# Patient Record
Sex: FEMALE | Race: WHITE | NOT HISPANIC OR LATINO | Employment: PART TIME | URBAN - NONMETROPOLITAN AREA
[De-identification: names, ages, dates, MRNs, and addresses within clinical notes are randomized per-mention and may not be internally consistent; named-entity substitution may affect disease eponyms.]

---

## 2023-08-08 ENCOUNTER — OFFICE VISIT (OUTPATIENT)
Dept: OBSTETRICS AND GYNECOLOGY | Facility: CLINIC | Age: 23
End: 2023-08-08
Payer: COMMERCIAL

## 2023-08-08 VITALS
WEIGHT: 138 LBS | BODY MASS INDEX: 24.45 KG/M2 | SYSTOLIC BLOOD PRESSURE: 100 MMHG | HEIGHT: 63 IN | DIASTOLIC BLOOD PRESSURE: 62 MMHG

## 2023-08-08 DIAGNOSIS — Z30.09 FAMILY PLANNING ADVICE: Primary | ICD-10-CM

## 2023-08-08 PROCEDURE — 99213 OFFICE O/P EST LOW 20 MIN: CPT | Performed by: OBSTETRICS & GYNECOLOGY

## 2023-08-08 NOTE — PROGRESS NOTES
"Maria Elena Arellano is a 22 y.o. female here today to discuss family-planning after a miscarriage in .  She had a early first trimester miscarriage, and she is currently cycle day 18 after resuming normal menstrual cycles.  She is considering another pregnancy, but is not sure of the timeframe.    Visit Vitals  /62 (BP Location: Left arm, Patient Position: Sitting)   Ht 160 cm (63\")   Wt 62.6 kg (138 lb)   LMP 2023 (Approximate)   Breastfeeding Unknown   BMI 24.45 kg/mý     Pleasant female no acute distress  Mood and affect normal  Breathing unlabored    Assessment: Recent spontaneous , family-planning advice    I recommend that she continue a daily prenatal vitamin, and add a daily 1 mg folic acid supplement.  She declines contraception at this time, but will contact the office if she desires to delay pregnancy.  I did offer to follow serial hCG levels with her next positive pregnancy test. We discussed other routine measures for healthy pregnancy.  Call with questions or concerns.       "

## 2024-03-08 ENCOUNTER — APPOINTMENT (OUTPATIENT)
Dept: CT IMAGING | Facility: HOSPITAL | Age: 24
End: 2024-03-08
Payer: COMMERCIAL

## 2024-03-08 ENCOUNTER — HOSPITAL ENCOUNTER (EMERGENCY)
Facility: HOSPITAL | Age: 24
Discharge: HOME | End: 2024-03-08
Payer: COMMERCIAL

## 2024-03-08 VITALS
HEART RATE: 90 BPM | OXYGEN SATURATION: 98 % | HEIGHT: 63 IN | SYSTOLIC BLOOD PRESSURE: 108 MMHG | TEMPERATURE: 98.5 F | WEIGHT: 143 LBS | DIASTOLIC BLOOD PRESSURE: 52 MMHG | BODY MASS INDEX: 25.34 KG/M2 | RESPIRATION RATE: 17 BRPM

## 2024-03-08 DIAGNOSIS — Z34.90 PREGNANCY, UNSPECIFIED GESTATIONAL AGE: ICD-10-CM

## 2024-03-08 DIAGNOSIS — R55 SYNCOPE AND COLLAPSE: Primary | ICD-10-CM

## 2024-03-08 DIAGNOSIS — R06.00 DYSPNEA, UNSPECIFIED TYPE: ICD-10-CM

## 2024-03-08 DIAGNOSIS — N39.0 ACUTE UTI: ICD-10-CM

## 2024-03-08 LAB
ALBUMIN SERPL-MCNC: 4.5 G/DL (ref 3.5–5.2)
ALBUMIN/GLOB SERPL: 1.3 G/DL
ALP SERPL-CCNC: 100 U/L (ref 39–117)
ALT SERPL W P-5'-P-CCNC: 11 U/L (ref 1–33)
ANION GAP SERPL CALCULATED.3IONS-SCNC: 10 MMOL/L (ref 5–15)
AST SERPL-CCNC: 16 U/L (ref 1–32)
B PARAPERT DNA SPEC QL NAA+PROBE: NOT DETECTED
B PERT DNA SPEC QL NAA+PROBE: NOT DETECTED
BACTERIA UR QL AUTO: ABNORMAL /HPF
BASOPHILS # BLD AUTO: 0.05 10*3/MM3 (ref 0–0.2)
BASOPHILS NFR BLD AUTO: 0.5 % (ref 0–1.5)
BILIRUB SERPL-MCNC: 0.2 MG/DL (ref 0–1.2)
BILIRUB UR QL STRIP: NEGATIVE
BUN SERPL-MCNC: 5 MG/DL (ref 6–20)
BUN/CREAT SERPL: 10.9 (ref 7–25)
C PNEUM DNA NPH QL NAA+NON-PROBE: NOT DETECTED
CALCIUM SPEC-SCNC: 10 MG/DL (ref 8.6–10.5)
CHLORIDE SERPL-SCNC: 101 MMOL/L (ref 98–107)
CLARITY UR: CLEAR
CO2 SERPL-SCNC: 26 MMOL/L (ref 22–29)
COLOR UR: ABNORMAL
CREAT SERPL-MCNC: 0.46 MG/DL (ref 0.57–1)
DEPRECATED RDW RBC AUTO: 37.7 FL (ref 37–54)
EGFRCR SERPLBLD CKD-EPI 2021: 139 ML/MIN/1.73
EOSINOPHIL # BLD AUTO: 0.37 10*3/MM3 (ref 0–0.4)
EOSINOPHIL NFR BLD AUTO: 3.6 % (ref 0.3–6.2)
ERYTHROCYTE [DISTWIDTH] IN BLOOD BY AUTOMATED COUNT: 12.6 % (ref 12.3–15.4)
FLUAV SUBTYP SPEC NAA+PROBE: NOT DETECTED
FLUBV RNA ISLT QL NAA+PROBE: NOT DETECTED
GLOBULIN UR ELPH-MCNC: 3.5 GM/DL
GLUCOSE SERPL-MCNC: 59 MG/DL (ref 65–99)
GLUCOSE UR STRIP-MCNC: NEGATIVE MG/DL
HADV DNA SPEC NAA+PROBE: NOT DETECTED
HCOV 229E RNA SPEC QL NAA+PROBE: NOT DETECTED
HCOV HKU1 RNA SPEC QL NAA+PROBE: NOT DETECTED
HCOV NL63 RNA SPEC QL NAA+PROBE: NOT DETECTED
HCOV OC43 RNA SPEC QL NAA+PROBE: NOT DETECTED
HCT VFR BLD AUTO: 40.9 % (ref 34–46.6)
HGB BLD-MCNC: 13 G/DL (ref 12–15.9)
HGB UR QL STRIP.AUTO: NEGATIVE
HMPV RNA NPH QL NAA+NON-PROBE: NOT DETECTED
HPIV1 RNA ISLT QL NAA+PROBE: NOT DETECTED
HPIV2 RNA SPEC QL NAA+PROBE: NOT DETECTED
HPIV3 RNA NPH QL NAA+PROBE: NOT DETECTED
HPIV4 P GENE NPH QL NAA+PROBE: NOT DETECTED
HYALINE CASTS UR QL AUTO: ABNORMAL /LPF
IMM GRANULOCYTES # BLD AUTO: 0.02 10*3/MM3 (ref 0–0.05)
IMM GRANULOCYTES NFR BLD AUTO: 0.2 % (ref 0–0.5)
KETONES UR QL STRIP: NEGATIVE
LEUKOCYTE ESTERASE UR QL STRIP.AUTO: ABNORMAL
LYMPHOCYTES # BLD AUTO: 1.67 10*3/MM3 (ref 0.7–3.1)
LYMPHOCYTES NFR BLD AUTO: 16.2 % (ref 19.6–45.3)
M PNEUMO IGG SER IA-ACNC: NOT DETECTED
MCH RBC QN AUTO: 25.9 PG (ref 26.6–33)
MCHC RBC AUTO-ENTMCNC: 31.8 G/DL (ref 31.5–35.7)
MCV RBC AUTO: 81.5 FL (ref 79–97)
MONOCYTES # BLD AUTO: 0.5 10*3/MM3 (ref 0.1–0.9)
MONOCYTES NFR BLD AUTO: 4.9 % (ref 5–12)
NEUTROPHILS NFR BLD AUTO: 7.68 10*3/MM3 (ref 1.7–7)
NEUTROPHILS NFR BLD AUTO: 74.6 % (ref 42.7–76)
NITRITE UR QL STRIP: NEGATIVE
NRBC BLD AUTO-RTO: 0 /100 WBC (ref 0–0.2)
PH UR STRIP.AUTO: 6 [PH] (ref 5–8)
PLATELET # BLD AUTO: 323 10*3/MM3 (ref 140–450)
PMV BLD AUTO: 9.7 FL (ref 6–12)
POTASSIUM SERPL-SCNC: 3.5 MMOL/L (ref 3.5–5.2)
PROT SERPL-MCNC: 8 G/DL (ref 6–8.5)
PROT UR QL STRIP: ABNORMAL
RBC # BLD AUTO: 5.02 10*6/MM3 (ref 3.77–5.28)
RBC # UR STRIP: ABNORMAL /HPF
REF LAB TEST METHOD: ABNORMAL
RHINOVIRUS RNA SPEC NAA+PROBE: NOT DETECTED
RSV RNA NPH QL NAA+NON-PROBE: NOT DETECTED
SARS-COV-2 RNA NPH QL NAA+NON-PROBE: NOT DETECTED
SODIUM SERPL-SCNC: 137 MMOL/L (ref 136–145)
SP GR UR STRIP: 1.02 (ref 1–1.03)
SQUAMOUS #/AREA URNS HPF: ABNORMAL /HPF
TROPONIN T SERPL HS-MCNC: <6 NG/L
UROBILINOGEN UR QL STRIP: ABNORMAL
WBC # UR STRIP: ABNORMAL /HPF
WBC NRBC COR # BLD AUTO: 10.29 10*3/MM3 (ref 3.4–10.8)

## 2024-03-08 PROCEDURE — 99285 EMERGENCY DEPT VISIT HI MDM: CPT

## 2024-03-08 PROCEDURE — 25010000002 CEFTRIAXONE PER 250 MG: Performed by: NURSE PRACTITIONER

## 2024-03-08 PROCEDURE — 0202U NFCT DS 22 TRGT SARS-COV-2: CPT | Performed by: NURSE PRACTITIONER

## 2024-03-08 PROCEDURE — 93005 ELECTROCARDIOGRAM TRACING: CPT

## 2024-03-08 PROCEDURE — 25810000003 LACTATED RINGERS SOLUTION: Performed by: NURSE PRACTITIONER

## 2024-03-08 PROCEDURE — 93010 ELECTROCARDIOGRAM REPORT: CPT | Performed by: INTERNAL MEDICINE

## 2024-03-08 PROCEDURE — 80053 COMPREHEN METABOLIC PANEL: CPT | Performed by: NURSE PRACTITIONER

## 2024-03-08 PROCEDURE — 96365 THER/PROPH/DIAG IV INF INIT: CPT

## 2024-03-08 PROCEDURE — 25510000001 IOPAMIDOL PER 1 ML: Performed by: NURSE PRACTITIONER

## 2024-03-08 PROCEDURE — 71275 CT ANGIOGRAPHY CHEST: CPT

## 2024-03-08 PROCEDURE — 81001 URINALYSIS AUTO W/SCOPE: CPT | Performed by: NURSE PRACTITIONER

## 2024-03-08 PROCEDURE — 85025 COMPLETE CBC W/AUTO DIFF WBC: CPT | Performed by: NURSE PRACTITIONER

## 2024-03-08 PROCEDURE — 84484 ASSAY OF TROPONIN QUANT: CPT | Performed by: NURSE PRACTITIONER

## 2024-03-08 RX ORDER — CEFDINIR 300 MG/1
300 CAPSULE ORAL 2 TIMES DAILY
Qty: 20 CAPSULE | Refills: 0 | Status: SHIPPED | OUTPATIENT
Start: 2024-03-08 | End: 2024-03-18

## 2024-03-08 RX ORDER — MELATONIN
1000 DAILY
COMMUNITY

## 2024-03-08 RX ADMIN — SODIUM CHLORIDE, POTASSIUM CHLORIDE, SODIUM LACTATE AND CALCIUM CHLORIDE 1000 ML: 600; 310; 30; 20 INJECTION, SOLUTION INTRAVENOUS at 09:30

## 2024-03-08 RX ADMIN — CEFTRIAXONE 1000 MG: 1 INJECTION, POWDER, FOR SOLUTION INTRAMUSCULAR; INTRAVENOUS at 10:59

## 2024-03-08 RX ADMIN — IOPAMIDOL 84 ML: 755 INJECTION, SOLUTION INTRAVENOUS at 11:13

## 2024-03-08 NOTE — Clinical Note
[facility] EMERGENCY DEPARTMENT  [address]  [facility] [address]  Phone: [phone]    Maria Elena Arellano was seen and treated in our emergency department on 3/8/2024.  She may return to work on 03/11/2024.         Thank you for choosing [facility].    Felicity De Los Santos APRN

## 2024-03-08 NOTE — ED PROVIDER NOTES
Subjective   History of Present Illness  Patient is a 23-year-old female presents emergency department after having a syncopal episode just prior to arrival.  Patient states she had just gotten up and felt dizzy and then she went to take a shower and passed out.  She states she is having some shortness of breath associated with the dizziness and just prior to the syncope as well.  She has had a history of syncopal events previously however she has never had a workup for this.  She states she was always told to eat something when she felt this way.  She states she normally does not have shortness of breath with the episodes.  She is also 9 weeks pregnant.  She denies any chest pain.  She states she felt fine when she went to bed last night.  No cough.  No associated nausea.  No vaginal bleeding.  No abdominal pain.    History provided by:  Patient   used: No        Review of Systems   Constitutional: Negative.    HENT: Negative.     Eyes: Negative.    Respiratory:  Positive for shortness of breath.    Cardiovascular: Negative.    Gastrointestinal: Negative.    Endocrine: Negative.    Genitourinary: Negative.    Musculoskeletal: Negative.    Skin: Negative.    Allergic/Immunologic: Negative.    Neurological:  Positive for syncope.   Hematological: Negative.    Psychiatric/Behavioral: Negative.     All other systems reviewed and are negative.      Past Medical History:   Diagnosis Date    Teratoma        No Known Allergies    Past Surgical History:   Procedure Laterality Date    OVARY SURGERY      teratoma removal       History reviewed. No pertinent family history.    Social History     Socioeconomic History    Marital status:    Tobacco Use    Smoking status: Never   Vaping Use    Vaping status: Every Day   Substance and Sexual Activity    Alcohol use: Not Currently    Drug use: Not Currently    Sexual activity: Yes     Partners: Male     Birth control/protection: None       Prior to  "Admission medications    Not on File       /40   Pulse 96   Temp 98.1 °F (36.7 °C) (Oral)   Resp 18   Ht 160 cm (63\")   Wt 64.9 kg (143 lb)   LMP 07/22/2023 (Approximate)   SpO2 96%   Breastfeeding No   BMI 25.33 kg/m²     Objective   Physical Exam  Vitals and nursing note reviewed.   Constitutional:       Appearance: She is well-developed.      Comments: Non toxic appearing. No acute distress   HENT:      Head: Normocephalic and atraumatic.   Eyes:      Conjunctiva/sclera: Conjunctivae normal.      Pupils: Pupils are equal, round, and reactive to light.   Neck:      Thyroid: No thyromegaly.      Trachea: No tracheal deviation.   Cardiovascular:      Rate and Rhythm: Normal rate and regular rhythm.      Heart sounds: Normal heart sounds.   Pulmonary:      Effort: Pulmonary effort is normal. No respiratory distress.      Breath sounds: Normal breath sounds. No wheezing or rales.   Chest:      Chest wall: No tenderness.   Abdominal:      General: Bowel sounds are normal.      Palpations: Abdomen is soft.   Musculoskeletal:         General: Normal range of motion.      Cervical back: Normal range of motion and neck supple.   Skin:     General: Skin is warm and dry.      Comments: Mild pallor noted.    Neurological:      Mental Status: She is alert and oriented to person, place, and time.      Cranial Nerves: No cranial nerve deficit.      Deep Tendon Reflexes: Reflexes are normal and symmetric.   Psychiatric:         Behavior: Behavior normal.         Thought Content: Thought content normal.         Judgment: Judgment normal.         Procedures         Lab Results (last 24 hours)       Procedure Component Value Units Date/Time    Respiratory Panel PCR w/COVID-19(SARS-CoV-2) KAYLEE/MAIA/NICOLE/PAD/COR/RUTHIE In-House, NP Swab in UTM/VTM, 2 HR TAT - Swab, Nasopharynx [[id number]]  (Normal) Collected: 03/08/24 0911    Specimen: Swab from Nasopharynx Updated: 03/08/24 1010     ADENOVIRUS, PCR Not Detected     " Coronavirus 229E Not Detected     Coronavirus HKU1 Not Detected     Coronavirus NL63 Not Detected     Coronavirus OC43 Not Detected     COVID19 Not Detected     Human Metapneumovirus Not Detected     Human Rhinovirus/Enterovirus Not Detected     Influenza A PCR Not Detected     Influenza B PCR Not Detected     Parainfluenza Virus 1 Not Detected     Parainfluenza Virus 2 Not Detected     Parainfluenza Virus 3 Not Detected     Parainfluenza Virus 4 Not Detected     RSV, PCR Not Detected     Bordetella pertussis pcr Not Detected     Bordetella parapertussis PCR Not Detected     Chlamydophila pneumoniae PCR Not Detected     Mycoplasma pneumo by PCR Not Detected    Narrative:      In the setting of a positive respiratory panel with a viral infection PLUS a negative procalcitonin without other underlying concern for bacterial infection, consider observing off antibiotics or discontinuation of antibiotics and continue supportive care. If the respiratory panel is positive for atypical bacterial infection (Bordetella pertussis, Chlamydophila pneumoniae, or Mycoplasma pneumoniae), consider antibiotic de-escalation to target atypical bacterial infection.    Urinalysis With Microscopic If Indicated (No Culture) - Urine, Clean Catch [[id number]]  (Abnormal) Collected: 03/08/24 0919    Specimen: Urine, Clean Catch Updated: 03/08/24 0943     Color, UA Dark Yellow     Appearance, UA Clear     pH, UA 6.0     Specific Gravity, UA 1.021     Glucose, UA Negative     Ketones, UA Negative     Bilirubin, UA Negative     Blood, UA Negative     Protein, UA Trace     Leuk Esterase, UA Moderate (2+)     Nitrite, UA Negative     Urobilinogen, UA 0.2 E.U./dL    Urinalysis, Microscopic Only - Urine, Clean Catch [[id number]]  (Abnormal) Collected: 03/08/24 0919    Specimen: Urine, Clean Catch Updated: 03/08/24 0943     RBC, UA 3-5 /HPF      WBC, UA 21-50 /HPF      Bacteria, UA 1+ /HPF      Squamous Epithelial Cells, UA 0-2 /HPF      Hyaline Casts,  UA 7-12 /LPF      Methodology Automated Microscopy    CBC & Differential [[id number]]  (Abnormal) Collected: 03/08/24 0930    Specimen: Blood Updated: 03/08/24 0941    Narrative:      The following orders were created for panel order CBC & Differential.  Procedure                               Abnormality         Status                     ---------                               -----------         ------                     CBC Auto Differential[[id number]]        Abnormal            Final result                 Please view results for these tests on the individual orders.    Comprehensive Metabolic Panel [[id number]]  (Abnormal) Collected: 03/08/24 0930    Specimen: Blood Updated: 03/08/24 1003     Glucose 59 mg/dL      BUN 5 mg/dL      Creatinine 0.46 mg/dL      Sodium 137 mmol/L      Potassium 3.5 mmol/L      Chloride 101 mmol/L      CO2 26.0 mmol/L      Calcium 10.0 mg/dL      Total Protein 8.0 g/dL      Albumin 4.5 g/dL      ALT (SGPT) 11 U/L      AST (SGOT) 16 U/L      Alkaline Phosphatase 100 U/L      Total Bilirubin 0.2 mg/dL      Globulin 3.5 gm/dL      A/G Ratio 1.3 g/dL      BUN/Creatinine Ratio 10.9     Anion Gap 10.0 mmol/L      eGFR 139.0 mL/min/1.73     Narrative:      GFR Normal >60  Chronic Kidney Disease <60  Kidney Failure <15      Single High Sensitivity Troponin T [[id number]]  (Normal) Collected: 03/08/24 0930    Specimen: Blood Updated: 03/08/24 0957     HS Troponin T <6 ng/L     Narrative:      High Sensitive Troponin T Reference Range:  <14.0 ng/L- Negative Female for AMI  <22.0 ng/L- Negative Male for AMI  >=14 - Abnormal Female indicating possible myocardial injury.  >=22 - Abnormal Male indicating possible myocardial injury.   Clinicians would have to utilize clinical acumen, EKG, Troponin, and serial changes to determine if it is an Acute Myocardial Infarction or myocardial injury due to an underlying chronic condition.         CBC Auto Differential [[id number]]  (Abnormal) Collected:  03/08/24 0930    Specimen: Blood Updated: 03/08/24 0941     WBC 10.29 10*3/mm3      RBC 5.02 10*6/mm3      Hemoglobin 13.0 g/dL      Hematocrit 40.9 %      MCV 81.5 fL      MCH 25.9 pg      MCHC 31.8 g/dL      RDW 12.6 %      RDW-SD 37.7 fl      MPV 9.7 fL      Platelets 323 10*3/mm3      Neutrophil % 74.6 %      Lymphocyte % 16.2 %      Monocyte % 4.9 %      Eosinophil % 3.6 %      Basophil % 0.5 %      Immature Grans % 0.2 %      Neutrophils, Absolute 7.68 10*3/mm3      Lymphocytes, Absolute 1.67 10*3/mm3      Monocytes, Absolute 0.50 10*3/mm3      Eosinophils, Absolute 0.37 10*3/mm3      Basophils, Absolute 0.05 10*3/mm3      Immature Grans, Absolute 0.02 10*3/mm3      nRBC 0.0 /100 WBC             CT Angiogram Chest   Final Result   1. No evidence of pulmonary thromboembolic disease. The thoracic aorta   is normal in caliber with no evidence of dissection or aneurysm. The   lungs are clear.       This report was signed and finalized on 3/8/2024 12:01 PM by Dr. Maico Ferrell MD.              ED Course  ED Course as of 03/08/24 1216   Fri Mar 08, 2024   1210 IMPRESSION:  1. No evidence of pulmonary thromboembolic disease. The thoracic aorta  is normal in caliber with no evidence of dissection or aneurysm. The  lungs are clear.     This report was signed and finalized on 3/8/2024 12:01 PM by Dr. Maico Ferrell MD.   [CW]   1210 Laboratory studies patient does have a significant urinary tract infection was given Rocephin while in the emergency department.  She received a liter of lactated Ringer's.  No leukocytosis.  Troponin is negative.  Glucose was 59.  Her vital signs are stable with a blood pressure 114/48 heart rate 96 respirations 18 O2 sat 96% on room air.  CTA of the chest is negative for pulmonary embolus or in the thoracic aorta is normal in caliber with no evidence of dissection or aneurysm.  Patient states she is feeling better at this time.  As noted earlier the patient has had the syncopal  episodes for quite some time however she has not been short of breath with them previously.  She has not had a workup for her syncope.  Advised patient she most likely needs an outpatient echo and follow-up for syncope.  Will prescribe antibiotics for urinary tract infection.  Patient is in agreement with the care plan.  She has an appointment with her OB in [place] next week.  She feels comfortable going home. Reviewed pt and pt care plan with Dr. Garcia- also in agreement with care plan. Pt will be discharged home shortly in stable condition  [CW]      ED Course User Index  [CW] Felicity De Los Santos APRN        Medical Decision Making  Patient is a 23-year-old female presents emergency department after having a syncopal episode just prior to arrival.  Patient states she had just gotten up and felt dizzy and then she went to take a shower and passed out.  She states she is having some shortness of breath associated with the dizziness and just prior to the syncope as well.  She has had a history of syncopal events previously however she has never had a workup for this.  She states she was always told to eat something when she felt this way.  She states she normally does not have shortness of breath with the episodes.  She is also 9 weeks pregnant.  She denies any chest pain.  She states she felt fine when she went to bed last night.  No cough.  No associated nausea.  No vaginal bleeding.  No abdominal pain.  Course of treatment in the er: Patient 23-year-old female presents emergency department after having a syncopal episode just prior to arrival.  Patient states she has a history of having dizziness spells and syncope however today was different and she felt short of breath with this episode as well.  She denies any chest pain.  She states she has mentioned this to her primary care provider in the past and has never had workup for this.  She denies any chest pain.  She is also 9 weeks pregnant.  She denies any  abdominal pain or vaginal bleeding.  No cough or congestion.  No recent illness.  She states she went to bed feeling fine last night.  She did not have nausea just had shortness of breath.  Lungs are clear to auscultation.  CV sinus rhythm.  Vitals are stable with a blood pressure 114/85, heart rate 100, respirations 18, O2 sats 100% on room air.  Advised the patient felt that she needed a CTA of the chest to rule out pulmonary embolus.  Advised risk and benefits of the testing.  Advised since her symptoms were different today she had associated shortness of breath with the syncope probably need further workup.  EKG and heart enzymes have been ordered as well and CMP and CBC have been ordered as well.  Differential dx: orthostatic hypotension; pulmonary embolus; hypoglycemia; covid; influenza   CT Angiogram Chest   Final Result    1. No evidence of pulmonary thromboembolic disease. The thoracic aorta    is normal in caliber with no evidence of dissection or aneurysm. The    lungs are clear.         This report was signed and finalized on 3/8/2024 12:01 PM by Dr. Maico Ferrell MD.          Labs Reviewed  COMPREHENSIVE METABOLIC PANEL - Abnormal; Notable for the following components:     Glucose                       59 (*)                 BUN                           5 (*)                  Creatinine                    0.46 (*)            All other components within normal limits         Narrative: GFR Normal >60                  Chronic Kidney Disease <60                  Kidney Failure <15                    URINALYSIS W/ MICROSCOPIC IF INDICATED (NO CULTURE) - Abnormal; Notable for the following components:     Color, UA                     Dark Yellow (*)               Protein, UA                   Trace (*)               Leuk Esterase, UA               (*)               All other components within normal limits  CBC WITH AUTO DIFFERENTIAL - Abnormal; Notable for the following components:     MCH                            25.9 (*)               Lymphocyte %                  16.2 (*)               Monocyte %                    4.9 (*)                Neutrophils, Absolute         7.68 (*)            All other components within normal limits  URINALYSIS, MICROSCOPIC ONLY - Abnormal; Notable for the following components:     RBC, UA                       3-5 (*)                WBC, UA                       21-50 (*)               Bacteria, UA                  1+ (*)              All other components within normal limits  RESPIRATORY PANEL PCR W/ COVID-19 (SARS-COV-2), NP SWAB IN UTM/VTP, 2 HR TAT - Normal         Narrative: In the setting of a positive respiratory panel with a viral infection PLUS a negative procalcitonin without other underlying concern for bacterial infection, consider observing off antibiotics or discontinuation of antibiotics and continue supportive care. If the respiratory panel is positive for atypical bacterial infection (Bordetella pertussis, Chlamydophila pneumoniae, or Mycoplasma pneumoniae), consider antibiotic de-escalation to target atypical bacterial infection.  SINGLE HSTROPONIN T - Normal         Narrative: High Sensitive Troponin T Reference Range:                  <14.0 ng/L- Negative Female for AMI                  <22.0 ng/L- Negative Male for AMI                  >=14 - Abnormal Female indicating possible myocardial injury.                  >=22 - Abnormal Male indicating possible myocardial injury.                   Clinicians would have to utilize clinical acumen, EKG, Troponin, and serial changes to determine if it is an Acute Myocardial Infarction or myocardial injury due to an underlying chronic condition.                                       CBC AND DIFFERENTIAL  Laboratory studies patient does have a significant urinary tract infection was given Rocephin while in the emergency department.  She received a liter of lactated Ringer's.  No leukocytosis.  Troponin is negative.   Glucose was 59.  Her vital signs are stable with a blood pressure 114/48 heart rate 96 respirations 18 O2 sat 96% on room air.  CTA of the chest is negative for pulmonary embolus or in the thoracic aorta is normal in caliber with no evidence of dissection or aneurysm.  Patient states she is feeling better at this time.  As noted earlier the patient has had the syncopal episodes for quite some time however she has not been short of breath with them previously.  She has not had a workup for her syncope.  Advised patient she most likely needs an outpatient echo and follow-up for syncope.  Will prescribe antibiotics for urinary tract infection.  Patient is in agreement with the care plan.  She has an appointment with her OB in [place] next week.  She feels comfortable going home. Reviewed pt and pt care plan with Dr. Garcia- also in agreement with care plan. Pt will be discharged home shortly in stable condit    Amount and/or Complexity of Data Reviewed  Labs: ordered. Decision-making details documented in ED Course.  Radiology: ordered. Decision-making details documented in ED Course.  ECG/medicine tests: ordered.    Risk  Prescription drug management.         Final diagnoses:   Syncope and collapse   Dyspnea, unspecified type   Acute UTI   Pregnancy, unspecified gestational age          Felicity De Los Santos, APRN  03/08/24 3071

## 2024-03-08 NOTE — DISCHARGE INSTRUCTIONS
Return to ER if symptoms worsen   Increase oral fluids  Follow up with you OB in [place] as scheduled next week  Advised may need further workup with echo

## 2024-03-09 LAB
QT INTERVAL: 376 MS
QTC INTERVAL: 417 MS